# Patient Record
Sex: MALE | Race: WHITE | NOT HISPANIC OR LATINO | Employment: STUDENT | URBAN - METROPOLITAN AREA
[De-identification: names, ages, dates, MRNs, and addresses within clinical notes are randomized per-mention and may not be internally consistent; named-entity substitution may affect disease eponyms.]

---
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
## 2019-01-04 DIAGNOSIS — Z87.74 HISTORY OF COMPLETE TRANSPOSITION OF GREAT VESSELS: Primary | ICD-10-CM

## 2019-01-08 LAB
OHS CV EVENT MONITOR DAY: 1
OHS CV HOLTER LENGTH DECIMAL HOURS: 25
OHS CV HOLTER LENGTH HOURS: 1
OHS CV HOLTER LENGTH MINUTES: 0

## 2019-01-09 ENCOUNTER — TELEPHONE (OUTPATIENT)
Dept: PEDIATRIC CARDIOLOGY | Facility: CLINIC | Age: 19
End: 2019-01-09

## 2019-02-13 ENCOUNTER — TELEPHONE (OUTPATIENT)
Dept: PEDIATRIC CARDIOLOGY | Facility: CLINIC | Age: 19
End: 2019-02-13

## 2019-02-13 NOTE — TELEPHONE ENCOUNTER
Spoke with mom, will fax over last office note, ekg and echo report, to Dr White in [place].    ----- Message from Christy Cole sent at 2/13/2019  3:44 PM CST -----  Contact: Mom [phone]  Reason for call: Patient is currently seeing a Cardiologist        Communication Preference: Mom [phone]    Additional Information: Mom states patient is currently seeing a Cardiologist in [place] and they may call to request records.

## 2019-02-27 ENCOUNTER — DOCUMENTATION ONLY (OUTPATIENT)
Dept: PEDIATRIC CARDIOLOGY | Facility: CLINIC | Age: 19
End: 2019-02-27

## 2019-02-27 NOTE — PROGRESS NOTES
I reviewed a clinic note from Dr. White dated February 18, 2019.  He had recently gone to the [facility] secondary to shortness of breath.  An EKG was normal.  An echocardiogram revealed mild pulmonary stenosis and mild to moderate pulmonary insufficiency.  There was no aortic insufficiency.  Biventricular function was normal.  It was felt that his symptoms were not cardiac in origin and likely right do instead to a viral upper respiratory illness.  He recommended follow-up with us as scheduled.

## 2019-09-21 ENCOUNTER — ANESTHESIA EVENT (OUTPATIENT)
Dept: SURGERY | Facility: HOSPITAL | Age: 19
End: 2019-09-21
Payer: MEDICAID

## 2019-09-21 ENCOUNTER — HOSPITAL ENCOUNTER (OUTPATIENT)
Facility: HOSPITAL | Age: 19
Discharge: HOME | End: 2019-09-22
Attending: EMERGENCY MEDICINE | Admitting: INTERNAL MEDICINE
Payer: MEDICAID

## 2019-09-21 ENCOUNTER — ANESTHESIA (OUTPATIENT)
Dept: SURGERY | Facility: HOSPITAL | Age: 19
End: 2019-09-21
Payer: MEDICAID

## 2019-09-21 DIAGNOSIS — K35.80 ACUTE APPENDICITIS, UNSPECIFIED ACUTE APPENDICITIS TYPE: ICD-10-CM

## 2019-09-21 DIAGNOSIS — K35.30 ACUTE APPENDICITIS WITH LOCALIZED PERITONITIS WITHOUT PERFORATION, UNSPECIFIED WHETHER ABSCESS PRESENT, UNSPECIFIED WHETHER GANGRENE PRESENT: Primary | ICD-10-CM

## 2019-09-21 DIAGNOSIS — K35.30 ACUTE APPENDICITIS WITH LOCALIZED PERITONITIS, WITHOUT PERFORATION, ABSCESS, OR GANGRENE: ICD-10-CM

## 2019-09-21 DIAGNOSIS — Z87.74 HISTORY OF COMPLETE TRANSPOSITION OF GREAT VESSELS: ICD-10-CM

## 2019-09-21 DIAGNOSIS — Z01.810 PREOPERATIVE CARDIOVASCULAR EXAMINATION: ICD-10-CM

## 2019-09-21 DIAGNOSIS — D72.829 LEUKOCYTOSIS, UNSPECIFIED TYPE: ICD-10-CM

## 2019-09-21 DIAGNOSIS — Q25.6 PULMONARY ARTERY STENOSIS: ICD-10-CM

## 2019-09-21 LAB
ALBUMIN SERPL BCP-MCNC: 5 G/DL (ref 3.5–5.2)
ALP SERPL-CCNC: 43 U/L (ref 55–135)
ALT SERPL W/O P-5'-P-CCNC: 29 U/L (ref 10–44)
ANION GAP SERPL CALC-SCNC: 8 MMOL/L (ref 8–16)
AST SERPL-CCNC: 24 U/L (ref 10–40)
BASOPHILS # BLD AUTO: 0.03 K/UL (ref 0–0.2)
BASOPHILS NFR BLD: 0.2 % (ref 0–1.9)
BILIRUB SERPL-MCNC: 1.4 MG/DL (ref 0.1–1)
BILIRUB UR QL STRIP: NEGATIVE
BUN SERPL-MCNC: 17 MG/DL (ref 6–20)
CALCIUM SERPL-MCNC: 9.7 MG/DL (ref 8.7–10.5)
CHLORIDE SERPL-SCNC: 101 MMOL/L (ref 95–110)
CLARITY UR: CLEAR
CO2 SERPL-SCNC: 29 MMOL/L (ref 23–29)
COLOR UR: YELLOW
CREAT SERPL-MCNC: 1 MG/DL (ref 0.5–1.4)
DIFFERENTIAL METHOD: ABNORMAL
EOSINOPHIL # BLD AUTO: 0 K/UL (ref 0–0.5)
EOSINOPHIL NFR BLD: 0.2 % (ref 0–8)
ERYTHROCYTE [DISTWIDTH] IN BLOOD BY AUTOMATED COUNT: 12.4 % (ref 11.5–14.5)
EST. GFR  (AFRICAN AMERICAN): >60 ML/MIN/1.73 M^2
EST. GFR  (NON AFRICAN AMERICAN): >60 ML/MIN/1.73 M^2
GLUCOSE SERPL-MCNC: 101 MG/DL (ref 70–110)
GLUCOSE UR QL STRIP: NEGATIVE
HCT VFR BLD AUTO: 47.3 % (ref 40–54)
HGB BLD-MCNC: 16 G/DL (ref 14–18)
HGB UR QL STRIP: NEGATIVE
IMM GRANULOCYTES # BLD AUTO: 0.05 K/UL (ref 0–0.04)
IMM GRANULOCYTES NFR BLD AUTO: 0.3 % (ref 0–0.5)
KETONES UR QL STRIP: NEGATIVE
LEUKOCYTE ESTERASE UR QL STRIP: NEGATIVE
LIPASE SERPL-CCNC: 28 U/L (ref 4–60)
LYMPHOCYTES # BLD AUTO: 2.2 K/UL (ref 1–4.8)
LYMPHOCYTES NFR BLD: 13.1 % (ref 18–48)
MCH RBC QN AUTO: 31.2 PG (ref 27–31)
MCHC RBC AUTO-ENTMCNC: 33.8 G/DL (ref 32–36)
MCV RBC AUTO: 92 FL (ref 82–98)
MONOCYTES # BLD AUTO: 1.1 K/UL (ref 0.3–1)
MONOCYTES NFR BLD: 6.7 % (ref 4–15)
NEUTROPHILS # BLD AUTO: 13.2 K/UL (ref 1.8–7.7)
NEUTROPHILS NFR BLD: 79.5 % (ref 38–73)
NITRITE UR QL STRIP: NEGATIVE
NRBC BLD-RTO: 0 /100 WBC
PH UR STRIP: 8 [PH] (ref 5–8)
PLATELET # BLD AUTO: 169 K/UL (ref 150–350)
PMV BLD AUTO: 10.1 FL (ref 9.2–12.9)
POTASSIUM SERPL-SCNC: 4.3 MMOL/L (ref 3.5–5.1)
PROT SERPL-MCNC: 8.2 G/DL (ref 6–8.4)
PROT UR QL STRIP: NEGATIVE
RBC # BLD AUTO: 5.13 M/UL (ref 4.6–6.2)
SODIUM SERPL-SCNC: 138 MMOL/L (ref 136–145)
SP GR UR STRIP: 1 (ref 1–1.03)
URN SPEC COLLECT METH UR: ABNORMAL
UROBILINOGEN UR STRIP-ACNC: NEGATIVE EU/DL
WBC # BLD AUTO: 16.66 K/UL (ref 3.9–12.7)

## 2019-09-21 PROCEDURE — 37000008 HC ANESTHESIA 1ST 15 MINUTES: Performed by: SURGERY

## 2019-09-21 PROCEDURE — 27201423 OPTIME MED/SURG SUP & DEVICES STERILE SUPPLY: Performed by: SURGERY

## 2019-09-21 PROCEDURE — 27202105 HC BIS BILATERAL SENSOR: Performed by: ANESTHESIOLOGY

## 2019-09-21 PROCEDURE — 71000033 HC RECOVERY, INTIAL HOUR: Performed by: SURGERY

## 2019-09-21 PROCEDURE — 25000003 PHARM REV CODE 250: Performed by: SURGERY

## 2019-09-21 PROCEDURE — 93005 ELECTROCARDIOGRAM TRACING: CPT

## 2019-09-21 PROCEDURE — 00840 ANES IPER PX LOWER ABD NOS: CPT | Performed by: SURGERY

## 2019-09-21 PROCEDURE — 99204 PR OFFICE/OUTPT VISIT, NEW, LEVL IV, 45-59 MIN: ICD-10-PCS | Mod: 57,,, | Performed by: SURGERY

## 2019-09-21 PROCEDURE — 96375 TX/PRO/DX INJ NEW DRUG ADDON: CPT

## 2019-09-21 PROCEDURE — 83690 ASSAY OF LIPASE: CPT

## 2019-09-21 PROCEDURE — 96374 THER/PROPH/DIAG INJ IV PUSH: CPT

## 2019-09-21 PROCEDURE — 25000003 PHARM REV CODE 250: Performed by: NURSE ANESTHETIST, CERTIFIED REGISTERED

## 2019-09-21 PROCEDURE — 96361 HYDRATE IV INFUSION ADD-ON: CPT

## 2019-09-21 PROCEDURE — 36000708 HC OR TIME LEV III 1ST 15 MIN: Performed by: SURGERY

## 2019-09-21 PROCEDURE — 27201107 HC STYLET, STANDARD: Performed by: ANESTHESIOLOGY

## 2019-09-21 PROCEDURE — 37000009 HC ANESTHESIA EA ADD 15 MINS: Performed by: SURGERY

## 2019-09-21 PROCEDURE — 63600175 PHARM REV CODE 636 W HCPCS: Performed by: EMERGENCY MEDICINE

## 2019-09-21 PROCEDURE — 44970 LAPAROSCOPY APPENDECTOMY: CPT | Mod: ,,, | Performed by: SURGERY

## 2019-09-21 PROCEDURE — 44970 PR LAP,APPENDECTOMY: ICD-10-PCS | Mod: ,,, | Performed by: SURGERY

## 2019-09-21 PROCEDURE — 99204 OFFICE O/P NEW MOD 45 MIN: CPT | Mod: 57,,, | Performed by: SURGERY

## 2019-09-21 PROCEDURE — 63600175 PHARM REV CODE 636 W HCPCS: Performed by: ANESTHESIOLOGY

## 2019-09-21 PROCEDURE — 80053 COMPREHEN METABOLIC PANEL: CPT

## 2019-09-21 PROCEDURE — 27000673 HC TUBING BLOOD Y: Performed by: ANESTHESIOLOGY

## 2019-09-21 PROCEDURE — G0378 HOSPITAL OBSERVATION PER HR: HCPCS

## 2019-09-21 PROCEDURE — 36415 COLL VENOUS BLD VENIPUNCTURE: CPT

## 2019-09-21 PROCEDURE — 27202103: Performed by: ANESTHESIOLOGY

## 2019-09-21 PROCEDURE — 71000039 HC RECOVERY, EACH ADD'L HOUR: Performed by: SURGERY

## 2019-09-21 PROCEDURE — 36000709 HC OR TIME LEV III EA ADD 15 MIN: Performed by: SURGERY

## 2019-09-21 PROCEDURE — 99285 EMERGENCY DEPT VISIT HI MDM: CPT | Mod: 25

## 2019-09-21 PROCEDURE — 25500020 PHARM REV CODE 255: Performed by: EMERGENCY MEDICINE

## 2019-09-21 PROCEDURE — 27000671 HC TUBING MICROBORE EXT: Performed by: ANESTHESIOLOGY

## 2019-09-21 PROCEDURE — 81003 URINALYSIS AUTO W/O SCOPE: CPT

## 2019-09-21 PROCEDURE — 63600175 PHARM REV CODE 636 W HCPCS: Performed by: NURSE ANESTHETIST, CERTIFIED REGISTERED

## 2019-09-21 PROCEDURE — S0028 INJECTION, FAMOTIDINE, 20 MG: HCPCS | Performed by: NURSE ANESTHETIST, CERTIFIED REGISTERED

## 2019-09-21 PROCEDURE — S0030 INJECTION, METRONIDAZOLE: HCPCS | Performed by: SURGERY

## 2019-09-21 PROCEDURE — 85025 COMPLETE CBC W/AUTO DIFF WBC: CPT

## 2019-09-21 PROCEDURE — 63600175 PHARM REV CODE 636 W HCPCS: Performed by: SURGERY

## 2019-09-21 PROCEDURE — 27000654 HC CATH IV JELCO: Performed by: ANESTHESIOLOGY

## 2019-09-21 RX ORDER — SODIUM CHLORIDE 9 MG/ML
INJECTION, SOLUTION INTRAVENOUS CONTINUOUS
Status: DISCONTINUED | OUTPATIENT
Start: 2019-09-21 | End: 2019-09-22 | Stop reason: HOSPADM

## 2019-09-21 RX ORDER — HYDROCODONE BITARTRATE AND ACETAMINOPHEN 5; 325 MG/1; MG/1
1 TABLET ORAL EVERY 4 HOURS PRN
Status: DISCONTINUED | OUTPATIENT
Start: 2019-09-21 | End: 2019-09-22 | Stop reason: HOSPADM

## 2019-09-21 RX ORDER — MEPERIDINE HYDROCHLORIDE 50 MG/ML
12.5 INJECTION INTRAMUSCULAR; INTRAVENOUS; SUBCUTANEOUS EVERY 10 MIN PRN
Status: ACTIVE | OUTPATIENT
Start: 2019-09-21 | End: 2019-09-21

## 2019-09-21 RX ORDER — ACETAMINOPHEN 10 MG/ML
1000 INJECTION, SOLUTION INTRAVENOUS EVERY 8 HOURS
Status: CANCELLED | OUTPATIENT
Start: 2019-09-21 | End: 2019-09-22

## 2019-09-21 RX ORDER — ENOXAPARIN SODIUM 100 MG/ML
40 INJECTION SUBCUTANEOUS EVERY 24 HOURS
Status: DISCONTINUED | OUTPATIENT
Start: 2019-09-22 | End: 2019-09-22 | Stop reason: HOSPADM

## 2019-09-21 RX ORDER — METRONIDAZOLE 500 MG/100ML
500 INJECTION, SOLUTION INTRAVENOUS
Status: COMPLETED | OUTPATIENT
Start: 2019-09-21 | End: 2019-09-22

## 2019-09-21 RX ORDER — GLYCOPYRROLATE 0.2 MG/ML
INJECTION INTRAMUSCULAR; INTRAVENOUS
Status: DISCONTINUED | OUTPATIENT
Start: 2019-09-21 | End: 2019-09-21

## 2019-09-21 RX ORDER — OXYCODONE HYDROCHLORIDE 5 MG/1
5 TABLET ORAL
Status: DISCONTINUED | OUTPATIENT
Start: 2019-09-21 | End: 2019-09-22 | Stop reason: HOSPADM

## 2019-09-21 RX ORDER — SODIUM CHLORIDE 0.9 % (FLUSH) 0.9 %
10 SYRINGE (ML) INJECTION
Status: DISCONTINUED | OUTPATIENT
Start: 2019-09-21 | End: 2019-09-22 | Stop reason: HOSPADM

## 2019-09-21 RX ORDER — BUPIVACAINE HYDROCHLORIDE AND EPINEPHRINE 5; 5 MG/ML; UG/ML
INJECTION, SOLUTION EPIDURAL; INTRACAUDAL; PERINEURAL
Status: DISCONTINUED | OUTPATIENT
Start: 2019-09-21 | End: 2019-09-21 | Stop reason: HOSPADM

## 2019-09-21 RX ORDER — DIPHENHYDRAMINE HYDROCHLORIDE 50 MG/ML
12.5 INJECTION INTRAMUSCULAR; INTRAVENOUS
Status: DISCONTINUED | OUTPATIENT
Start: 2019-09-21 | End: 2019-09-22 | Stop reason: HOSPADM

## 2019-09-21 RX ORDER — ACETAMINOPHEN 10 MG/ML
INJECTION, SOLUTION INTRAVENOUS
Status: DISCONTINUED | OUTPATIENT
Start: 2019-09-21 | End: 2019-09-21

## 2019-09-21 RX ORDER — KETOROLAC TROMETHAMINE 30 MG/ML
15 INJECTION, SOLUTION INTRAMUSCULAR; INTRAVENOUS
Status: COMPLETED | OUTPATIENT
Start: 2019-09-21 | End: 2019-09-21

## 2019-09-21 RX ORDER — CEFAZOLIN SODIUM 2 G/50ML
2 SOLUTION INTRAVENOUS
Status: COMPLETED | OUTPATIENT
Start: 2019-09-21 | End: 2019-09-22

## 2019-09-21 RX ORDER — HYDROMORPHONE HYDROCHLORIDE 1 MG/ML
1 INJECTION, SOLUTION INTRAMUSCULAR; INTRAVENOUS; SUBCUTANEOUS EVERY 4 HOURS PRN
Status: DISCONTINUED | OUTPATIENT
Start: 2019-09-21 | End: 2019-09-22 | Stop reason: HOSPADM

## 2019-09-21 RX ORDER — SODIUM CHLORIDE 0.9 G/100ML
IRRIGANT IRRIGATION
Status: DISCONTINUED | OUTPATIENT
Start: 2019-09-21 | End: 2019-09-21 | Stop reason: HOSPADM

## 2019-09-21 RX ORDER — PROPOFOL 10 MG/ML
VIAL (ML) INTRAVENOUS
Status: DISCONTINUED | OUTPATIENT
Start: 2019-09-21 | End: 2019-09-21

## 2019-09-21 RX ORDER — LIDOCAINE HYDROCHLORIDE 20 MG/ML
INJECTION, SOLUTION EPIDURAL; INFILTRATION; INTRACAUDAL; PERINEURAL
Status: DISCONTINUED | OUTPATIENT
Start: 2019-09-21 | End: 2019-09-21

## 2019-09-21 RX ORDER — ROCURONIUM BROMIDE 10 MG/ML
INJECTION, SOLUTION INTRAVENOUS
Status: DISCONTINUED | OUTPATIENT
Start: 2019-09-21 | End: 2019-09-21

## 2019-09-21 RX ORDER — HYDROMORPHONE HYDROCHLORIDE 1 MG/ML
0.2 INJECTION, SOLUTION INTRAMUSCULAR; INTRAVENOUS; SUBCUTANEOUS
Status: DISCONTINUED | OUTPATIENT
Start: 2019-09-21 | End: 2019-09-22 | Stop reason: HOSPADM

## 2019-09-21 RX ORDER — FAMOTIDINE 10 MG/ML
INJECTION INTRAVENOUS
Status: DISCONTINUED | OUTPATIENT
Start: 2019-09-21 | End: 2019-09-21

## 2019-09-21 RX ORDER — ONDANSETRON 2 MG/ML
4 INJECTION INTRAMUSCULAR; INTRAVENOUS DAILY PRN
Status: DISCONTINUED | OUTPATIENT
Start: 2019-09-21 | End: 2019-09-22 | Stop reason: HOSPADM

## 2019-09-21 RX ORDER — SUCCINYLCHOLINE CHLORIDE 20 MG/ML
INJECTION INTRAMUSCULAR; INTRAVENOUS
Status: DISCONTINUED | OUTPATIENT
Start: 2019-09-21 | End: 2019-09-21

## 2019-09-21 RX ORDER — DEXAMETHASONE SODIUM PHOSPHATE 4 MG/ML
INJECTION, SOLUTION INTRA-ARTICULAR; INTRALESIONAL; INTRAMUSCULAR; INTRAVENOUS; SOFT TISSUE
Status: DISCONTINUED | OUTPATIENT
Start: 2019-09-21 | End: 2019-09-21

## 2019-09-21 RX ORDER — ONDANSETRON 2 MG/ML
4 INJECTION INTRAMUSCULAR; INTRAVENOUS
Status: COMPLETED | OUTPATIENT
Start: 2019-09-21 | End: 2019-09-21

## 2019-09-21 RX ORDER — SODIUM CHLORIDE, SODIUM LACTATE, POTASSIUM CHLORIDE, CALCIUM CHLORIDE 600; 310; 30; 20 MG/100ML; MG/100ML; MG/100ML; MG/100ML
INJECTION, SOLUTION INTRAVENOUS CONTINUOUS PRN
Status: DISCONTINUED | OUTPATIENT
Start: 2019-09-21 | End: 2019-09-21

## 2019-09-21 RX ORDER — FENTANYL CITRATE 50 UG/ML
INJECTION, SOLUTION INTRAMUSCULAR; INTRAVENOUS
Status: DISCONTINUED | OUTPATIENT
Start: 2019-09-21 | End: 2019-09-21

## 2019-09-21 RX ORDER — SODIUM CHLORIDE 0.9 % (FLUSH) 0.9 %
10 SYRINGE (ML) INJECTION
Status: DISCONTINUED | OUTPATIENT
Start: 2019-09-21 | End: 2019-09-21

## 2019-09-21 RX ORDER — MIDAZOLAM HYDROCHLORIDE 1 MG/ML
INJECTION INTRAMUSCULAR; INTRAVENOUS
Status: DISCONTINUED | OUTPATIENT
Start: 2019-09-21 | End: 2019-09-21

## 2019-09-21 RX ADMIN — GLYCOPYRROLATE 0.2 MG: 0.2 INJECTION INTRAMUSCULAR; INTRAVENOUS at 05:09

## 2019-09-21 RX ADMIN — SODIUM CHLORIDE, SODIUM LACTATE, POTASSIUM CHLORIDE, AND CALCIUM CHLORIDE: .6; .31; .03; .02 INJECTION, SOLUTION INTRAVENOUS at 04:09

## 2019-09-21 RX ADMIN — SODIUM CHLORIDE 1000 ML: 0.9 INJECTION, SOLUTION INTRAVENOUS at 01:09

## 2019-09-21 RX ADMIN — PIPERACILLIN AND TAZOBACTAM 3.38 G: 3; .375 INJECTION, POWDER, LYOPHILIZED, FOR SOLUTION INTRAVENOUS; PARENTERAL at 04:09

## 2019-09-21 RX ADMIN — ROCURONIUM BROMIDE 10 MG: 10 INJECTION, SOLUTION INTRAVENOUS at 05:09

## 2019-09-21 RX ADMIN — SODIUM CHLORIDE, SODIUM LACTATE, POTASSIUM CHLORIDE, AND CALCIUM CHLORIDE: .6; .31; .03; .02 INJECTION, SOLUTION INTRAVENOUS at 05:09

## 2019-09-21 RX ADMIN — SUCCINYLCHOLINE CHLORIDE 120 MG: 20 INJECTION, SOLUTION INTRAMUSCULAR; INTRAVENOUS at 05:09

## 2019-09-21 RX ADMIN — IOHEXOL 100 ML: 350 INJECTION, SOLUTION INTRAVENOUS at 03:09

## 2019-09-21 RX ADMIN — LIDOCAINE HYDROCHLORIDE 100 MG: 20 INJECTION, SOLUTION EPIDURAL; INFILTRATION; INTRACAUDAL; PERINEURAL at 05:09

## 2019-09-21 RX ADMIN — MIDAZOLAM HYDROCHLORIDE 2 MG: 1 INJECTION, SOLUTION INTRAMUSCULAR; INTRAVENOUS at 04:09

## 2019-09-21 RX ADMIN — ROCURONIUM BROMIDE 35 MG: 10 INJECTION, SOLUTION INTRAVENOUS at 05:09

## 2019-09-21 RX ADMIN — CEFAZOLIN SODIUM 2 G: 2 SOLUTION INTRAVENOUS at 10:09

## 2019-09-21 RX ADMIN — ONDANSETRON 4 MG: 2 INJECTION INTRAMUSCULAR; INTRAVENOUS at 01:09

## 2019-09-21 RX ADMIN — DEXAMETHASONE SODIUM PHOSPHATE 8 MG: 4 INJECTION, SOLUTION INTRAMUSCULAR; INTRAVENOUS at 05:09

## 2019-09-21 RX ADMIN — ACETAMINOPHEN 1000 MG: 10 INJECTION, SOLUTION INTRAVENOUS at 04:09

## 2019-09-21 RX ADMIN — METRONIDAZOLE 500 MG: 500 SOLUTION INTRAVENOUS at 08:09

## 2019-09-21 RX ADMIN — PROPOFOL 50 MG: 10 INJECTION, EMULSION INTRAVENOUS at 05:09

## 2019-09-21 RX ADMIN — FENTANYL CITRATE 50 MCG: 50 INJECTION INTRAMUSCULAR; INTRAVENOUS at 05:09

## 2019-09-21 RX ADMIN — SUGAMMADEX 318 MG: 100 INJECTION, SOLUTION INTRAVENOUS at 05:09

## 2019-09-21 RX ADMIN — KETOROLAC TROMETHAMINE 15 MG: 30 INJECTION, SOLUTION INTRAMUSCULAR at 01:09

## 2019-09-21 RX ADMIN — ONDANSETRON 4 MG: 2 INJECTION INTRAMUSCULAR; INTRAVENOUS at 06:09

## 2019-09-21 RX ADMIN — PROPOFOL 200 MG: 10 INJECTION, EMULSION INTRAVENOUS at 05:09

## 2019-09-21 RX ADMIN — ROCURONIUM BROMIDE 5 MG: 10 INJECTION, SOLUTION INTRAVENOUS at 05:09

## 2019-09-21 RX ADMIN — FENTANYL CITRATE 100 MCG: 50 INJECTION INTRAMUSCULAR; INTRAVENOUS at 05:09

## 2019-09-21 RX ADMIN — FAMOTIDINE 20 MG: 10 INJECTION, SOLUTION INTRAVENOUS at 04:09

## 2019-09-21 NOTE — ASSESSMENT & PLAN NOTE
CT showed appendicitis. IV abx given in ED. General Surgery consulted with plan for appendectomy. NPO

## 2019-09-21 NOTE — TRANSFER OF CARE
"Anesthesia Transfer of Care Note    Patient: Harry Patel    Procedure(s) Performed: Procedure(s) (LRB):  APPENDECTOMY, LAPAROSCOPIC (N/A)    Patient location: PACU    Anesthesia Type: general    Transport from OR: Transported from OR on room air with adequate spontaneous ventilation    Post pain: adequate analgesia    Post assessment: no apparent anesthetic complications    Post vital signs: stable    Level of consciousness: awake, alert and oriented    Nausea/Vomiting: no nausea/vomiting    Complications: none    Transfer of care protocol was followed      Last vitals:   Visit Vitals  BP (!) 115/57 (BP Location: Left arm, Patient Position: Lying)   Pulse 68   Temp 36.6 °C (97.8 °F) (Temporal)   Resp 16   Ht 5' 11" (1.803 m)   Wt 79.4 kg (175 lb)   SpO2 100%   BMI 24.41 kg/m²     "

## 2019-09-21 NOTE — SUBJECTIVE & OBJECTIVE
No current facility-administered medications on file prior to encounter.      No current outpatient medications on file prior to encounter.       Review of patient's allergies indicates:  No Known Allergies    Past Medical History:   Diagnosis Date    Diaphragmatic paralysis     post-op; left side    Pulmonary stenosis     mild    TGA (transposition of great arteries)     Wrist fracture      Past Surgical History:   Procedure Laterality Date    ARTERIAL SWITCH  12/17/99    @ [facility]    CARDIAC SURGERY      DIAPHRAGM PLICATION  12/27/99    @ [facility] - left    WISDOM TOOTH EXTRACTION       Family History     Problem Relation (Age of Onset)    AVM Mother    Congenital heart disease Sister        Tobacco Use    Smoking status: Former Smoker    Smokeless tobacco: Never Used    Tobacco comment: grandfather smokes outside.   Substance and Sexual Activity    Alcohol use: Yes    Drug use: Not Currently     Types: Marijuana    Sexual activity: Not on file     Review of Systems   Constitutional: Negative for activity change, appetite change, diaphoresis, fever and unexpected weight change.   Respiratory: Negative for cough, chest tightness and wheezing.    Cardiovascular: Negative for chest pain and palpitations.   Gastrointestinal: Positive for abdominal pain. Negative for abdominal distention, anal bleeding, blood in stool, constipation, diarrhea, nausea, rectal pain and vomiting.   Genitourinary: Negative for difficulty urinating, dysuria and hematuria.   Musculoskeletal: Negative for back pain and gait problem.   Skin: Negative for color change and wound.   Neurological: Negative for tremors and seizures.   Hematological: Negative for adenopathy. Does not bruise/bleed easily.   Psychiatric/Behavioral: Negative for agitation and decreased concentration.     Objective:     Vital Signs (Most Recent):  Pulse: (!) 55 (09/21/19 1500)  Resp: 16 (09/21/19 1318)  BP: (!) 126/58 (09/21/19 1500)  SpO2: 99 % (09/21/19 1500)  Vital Signs (24h Range):  Pulse:  [55-63] 55  Resp:  [16] 16  SpO2:  [98 %-100 %] 99 %  BP: (125-143)/(58-73) 126/58     Weight: 79.4 kg (175 lb)  Body mass index is 24.41 kg/m².    Physical Exam   Constitutional: He is oriented to person, place, and time. He appears well-developed and well-nourished.   HENT:   Head: Normocephalic and atraumatic.   Eyes: Pupils are equal, round, and reactive to light. Right eye exhibits no discharge. Left eye exhibits no discharge. No scleral icterus.   Neck: Normal range of motion. No tracheal deviation present. No thyromegaly present.   Cardiovascular: Normal rate, regular rhythm and normal heart sounds. Exam reveals no gallop and no friction rub.   No murmur heard.  Pulmonary/Chest: Effort normal and breath sounds normal. He has no wheezes. He exhibits no tenderness.   Abdominal: He exhibits no distension and no mass. There is tenderness. There is guarding. There is no rebound. No hernia.       Musculoskeletal: Normal range of motion.   Lymphadenopathy:     He has no cervical adenopathy.   Neurological: He is alert and oriented to person, place, and time. Coordination normal.   Skin: Skin is warm.   Psychiatric: He has a normal mood and affect.   Vitals reviewed.      Significant Labs:  CBC:   Recent Labs   Lab 09/21/19  1340   WBC 16.66*   RBC 5.13   HGB 16.0   HCT 47.3      MCV 92   MCH 31.2*   MCHC 33.8     BMP:   Recent Labs   Lab 09/21/19  1340         K 4.3      CO2 29   BUN 17   CREATININE 1.0   CALCIUM 9.7       Significant Diagnostics:  CT scan with findings suggestive of acute appendicitis

## 2019-09-21 NOTE — BRIEF OP NOTE
[facility]  Brief Operative Note    SUMMARY     Surgery Date: 9/21/2019     Surgeon(s) and Role:     * Harsh Power MD - Primary    Assisting Surgeon: None    Pre-op Diagnosis:  Acute appendicitis with localized peritonitis without perforation, unspecified whether abscess present, unspecified whether gangrene present [K35.30]    Post-op Diagnosis:  Post-Op Diagnosis Codes:     * Acute appendicitis with localized peritonitis without perforation, unspecified whether abscess present, unspecified whether gangrene present [K35.30]    Procedure(s) (LRB):  APPENDECTOMY, LAPAROSCOPIC (N/A)    Anesthesia: General    Description of Procedure: Laparoscopic appendectomy of inflamed appendix    Description of the findings of the procedure: see above    Estimated Blood Loss: 15 mL         Specimens:   Specimen (12h ago, onward)    Start     Ordered    09/21/19 1738  Specimen to Pathology - Surgery  Once     Comments:  Pre-op Diagnosis: Acute appendicitis with localized peritonitis without perforation, unspecified whether abscess present, unspecified whether gangrene present [K35.30]Procedure(s):APPENDECTOMY, LAPAROSCOPIC Number of specimens: 1Name of specimens: appendix     Start Status     09/21/19 1738 Collected (09/21/19 1739) Order ID: [id number]       09/21/19 1738

## 2019-09-21 NOTE — CONSULTS
[facility]  General Surgery  Consult Note    Patient Name: Harry Patel  MRN: [id number]  Code Status: Full Code  Admission Date: 9/21/2019  Hospital Length of Stay: 0 days  Attending Physician: Christopher Moreira MD  Primary Care Provider: Primary Doctor No    Patient information was obtained from patient and ER records.     Inpatient consult to General Surgery  Consult performed by: Harsh Power MD  Consult ordered by: JARET Soto  Assessment/Recommendations: TO OR        Subjective:     Principal Problem: Acute appendicitis    History of Present Illness: Pleasant 17 yo M presented to the hospital this afternoon with pain in thelower abdomen.  He notes that he woke with pain early this Am.  NOtes that the pain got progressively worse and caused him to come to er.  Notes that pain was in lower abdomen.  He reports having burning and discomfort with urination.  He denies sick contacts or changes in bowel habits.  PT has no family history of IBD.  Pt otherwise without complaint. He is healthy and has had transposition procedure as an infant.  No abdominal surgical history    No new subjective & objective note has been filed under this hospital service since the last note was generated.    Assessment/Plan:     * Acute appendicitis  To OR today for lap appy      VTE Risk Mitigation (From admission, onward)    None          Thank you for your consult. I will follow-up with patient. Please contact us if you have any additional questions.    Harsh Power MD  General Surgery  [facility]

## 2019-09-21 NOTE — H&P
[facility]  General Surgery  History & Physical    Patient Name: Harry Patel  MRN: [id number]  Admission Date: 9/21/2019  Attending Physician: Christopher Moreira MD   Primary Care Provider: Primary Doctor No    Patient information was obtained from patient and ER records.     Subjective:     Chief Complaint/Reason for Admission: appendicitis    History of Present Illness: Pleasant 19 yo M presented to the hospital this afternoon with pain in thelower abdomen.  He notes that he woke with pain early this Am.  NOtes that the pain got progressively worse and caused him to come to er.  Notes that pain was in lower abdomen.  He reports having burning and discomfort with urination.  He denies sick contacts or changes in bowel habits.  PT has no family history of IBD.  Pt otherwise without complaint. He is healthy and has had transposition procedure as an infant.  No abdominal surgical history    No current facility-administered medications on file prior to encounter.      No current outpatient medications on file prior to encounter.       Review of patient's allergies indicates:  No Known Allergies    Past Medical History:   Diagnosis Date    Diaphragmatic paralysis     post-op; left side    Pulmonary stenosis     mild    TGA (transposition of great arteries)     Wrist fracture      Past Surgical History:   Procedure Laterality Date    ARTERIAL SWITCH  12/17/99    @ [facility]    CARDIAC SURGERY      DIAPHRAGM PLICATION  12/27/99    @ [facility] - left    WISDOM TOOTH EXTRACTION       Family History     Problem Relation (Age of Onset)    AVM Mother    Congenital heart disease Sister        Tobacco Use    Smoking status: Former Smoker    Smokeless tobacco: Never Used    Tobacco comment: grandfather smokes outside.   Substance and Sexual Activity    Alcohol use: Yes    Drug use: Not Currently     Types: Marijuana    Sexual activity: Not on file     Review of Systems   Constitutional: Negative for activity  change, appetite change, diaphoresis, fever and unexpected weight change.   Respiratory: Negative for cough, chest tightness and wheezing.    Cardiovascular: Negative for chest pain and palpitations.   Gastrointestinal: Positive for abdominal pain. Negative for abdominal distention, anal bleeding, blood in stool, constipation, diarrhea, nausea, rectal pain and vomiting.   Genitourinary: Negative for difficulty urinating, dysuria and hematuria.   Musculoskeletal: Negative for back pain and gait problem.   Skin: Negative for color change and wound.   Neurological: Negative for tremors and seizures.   Hematological: Negative for adenopathy. Does not bruise/bleed easily.   Psychiatric/Behavioral: Negative for agitation and decreased concentration.     Objective:     Vital Signs (Most Recent):  Pulse: (!) 55 (09/21/19 1500)  Resp: 16 (09/21/19 1318)  BP: (!) 126/58 (09/21/19 1500)  SpO2: 99 % (09/21/19 1500) Vital Signs (24h Range):  Pulse:  [55-63] 55  Resp:  [16] 16  SpO2:  [98 %-100 %] 99 %  BP: (125-143)/(58-73) 126/58     Weight: 79.4 kg (175 lb)  Body mass index is 24.41 kg/m².    Physical Exam   Constitutional: He is oriented to person, place, and time. He appears well-developed and well-nourished.   HENT:   Head: Normocephalic and atraumatic.   Eyes: Pupils are equal, round, and reactive to light. Right eye exhibits no discharge. Left eye exhibits no discharge. No scleral icterus.   Neck: Normal range of motion. No tracheal deviation present. No thyromegaly present.   Cardiovascular: Normal rate, regular rhythm and normal heart sounds. Exam reveals no gallop and no friction rub.   No murmur heard.  Pulmonary/Chest: Effort normal and breath sounds normal. He has no wheezes. He exhibits no tenderness.   Abdominal: He exhibits no distension and no mass. There is tenderness. There is guarding. There is no rebound. No hernia.       Musculoskeletal: Normal range of motion.   Lymphadenopathy:     He has no cervical  adenopathy.   Neurological: He is alert and oriented to person, place, and time. Coordination normal.   Skin: Skin is warm.   Psychiatric: He has a normal mood and affect.   Vitals reviewed.      Significant Labs:  CBC:   Recent Labs   Lab 09/21/19  1340   WBC 16.66*   RBC 5.13   HGB 16.0   HCT 47.3      MCV 92   MCH 31.2*   MCHC 33.8     BMP:   Recent Labs   Lab 09/21/19  1340         K 4.3      CO2 29   BUN 17   CREATININE 1.0   CALCIUM 9.7       Significant Diagnostics:  CT scan with findings suggestive of acute appendicitis    Assessment/Plan:     * Acute appendicitis  To OR today for lap appy      VTE Risk Mitigation (From admission, onward)    None          Harsh Power MD  General Surgery  [facility]

## 2019-09-21 NOTE — SUBJECTIVE & OBJECTIVE
Past Medical History:   Diagnosis Date    Diaphragmatic paralysis     post-op; left side    Pulmonary stenosis     mild    TGA (transposition of great arteries)     Wrist fracture        Past Surgical History:   Procedure Laterality Date    ARTERIAL SWITCH  12/17/99    @ [facility]    CARDIAC SURGERY      DIAPHRAGM PLICATION  12/27/99    @ [facility] - left    WISDOM TOOTH EXTRACTION         Review of patient's allergies indicates:  No Known Allergies    No current facility-administered medications on file prior to encounter.      No current outpatient medications on file prior to encounter.     Family History     Problem Relation (Age of Onset)    AVM Mother    Congenital heart disease Sister        Tobacco Use    Smoking status: Former Smoker    Smokeless tobacco: Never Used    Tobacco comment: grandfather smokes outside.   Substance and Sexual Activity    Alcohol use: Yes    Drug use: Not Currently     Types: Marijuana    Sexual activity: Not on file     Review of Systems   Constitutional: Negative for chills, diaphoresis and fever.   HENT: Negative for congestion, postnasal drip, sinus pressure and sore throat.    Eyes: Negative for visual disturbance.   Respiratory: Negative for cough, chest tightness, shortness of breath and wheezing.    Cardiovascular: Negative for chest pain, palpitations and leg swelling.   Gastrointestinal: Positive for abdominal distention (midabdomen) and nausea. Negative for abdominal pain, blood in stool, constipation, diarrhea and vomiting.   Endocrine: Negative.    Genitourinary: Negative for dysuria.   Musculoskeletal: Negative.    Skin: Negative.    Allergic/Immunologic: Negative.    Neurological: Negative for dizziness, weakness, numbness and headaches.   Hematological: Negative.    Psychiatric/Behavioral: Negative.      Objective:     Vital Signs (Most Recent):  Pulse: (!) 55 (09/21/19 1500)  Resp: 16 (09/21/19 1318)  BP: (!) 126/58 (09/21/19 1500)  SpO2: 99 % (09/21/19 1500)  Vital Signs (24h Range):  Pulse:  [55-63] 55  Resp:  [16] 16  SpO2:  [98 %-100 %] 99 %  BP: (125-143)/(58-73) 126/58     Weight: 79.4 kg (175 lb)  Body mass index is 24.41 kg/m².    Physical Exam   Constitutional: He is oriented to person, place, and time. He appears well-developed and well-nourished. He is cooperative.  Non-toxic appearance. No distress.   HENT:   Head: Normocephalic and atraumatic.   Eyes: Pupils are equal, round, and reactive to light. Conjunctivae and lids are normal.   Neck: Trachea normal, normal range of motion and full passive range of motion without pain. Neck supple. Normal carotid pulses and no JVD present. No tracheal deviation present. No thyroid mass and no thyromegaly present.   Cardiovascular: Normal rate, regular rhythm, S1 normal, S2 normal, normal heart sounds and normal pulses.   Pulmonary/Chest: Effort normal and breath sounds normal. No stridor.   Abdominal: Soft. Normal appearance and bowel sounds are normal. There is tenderness. There is no rebound and no guarding.   Musculoskeletal: Normal range of motion.   Neurological: He is alert and oriented to person, place, and time. He has normal strength. No cranial nerve deficit or sensory deficit.   Skin: Skin is warm, dry and intact. He is not diaphoretic. No cyanosis. Nails show no clubbing.   Psychiatric: He has a normal mood and affect. His speech is normal and behavior is normal. Judgment and thought content normal. Cognition and memory are normal.         CRANIAL NERVES     CN III, IV, VI   Pupils are equal, round, and reactive to light.       Significant Labs:   Blood Culture: No results for input(s): LABBLOO in the last 48 hours.  CBC:   Recent Labs   Lab 09/21/19  1340   WBC 16.66*   HGB 16.0   HCT 47.3        CMP:   Recent Labs   Lab 09/21/19  1340      K 4.3      CO2 29      BUN 17   CREATININE 1.0   CALCIUM 9.7   PROT 8.2   ALBUMIN 5.0   BILITOT 1.4*   ALKPHOS 43*   AST 24   ALT 29   ANIONGAP  8   EGFRNONAA >60.0     Cardiac Markers: No results for input(s): CKMB, MYOGLOBIN, BNP, TROPISTAT in the last 48 hours.  Coagulation: No results for input(s): PT, INR, APTT in the last 48 hours.  Lactic Acid: No results for input(s): LACTATE in the last 48 hours.  Lipase:   Recent Labs   Lab 09/21/19  1340   LIPASE 28     Lipid Panel: No results for input(s): CHOL, HDL, LDLCALC, TRIG, CHOLHDL in the last 48 hours.  Magnesium: No results for input(s): MG in the last 48 hours.  Troponin: No results for input(s): TROPONINI in the last 48 hours.  TSH: No results for input(s): TSH in the last 4320 hours.  Urine Studies:   Recent Labs   Lab 09/21/19  1415   COLORU Yellow   APPEARANCEUA Clear   PHUR 8.0   SPECGRAV 1.000*   PROTEINUA Negative   GLUCUA Negative   KETONESU Negative   BILIRUBINUA Negative   OCCULTUA Negative   NITRITE Negative   UROBILINOGEN Negative   LEUKOCYTESUR Negative       Significant Imaging: I have reviewed all pertinent imaging results/findings within the past 24 hours.   Ct Abdomen Pelvis With Contrast    Result Date: 9/21/2019  CMS MANDATED QUALITY DATA - CT RADIATION - 436 All CT scans at this facility utilize dose modulation, iterative reconstruction, and/or weight based dosing when appropriate to reduce radiation dose to as low as reasonably achievable. CLINICAL HISTORY: ([id number])19 y/o  (2000) M Abd pain, fever, abscess suspected; TECHNIQUE: (A#[id number], exam time 9/21/2019 15:17) CT ABDOMEN PELVIS WITH CONTRAST [id number] Axial CT images of the abdomen and pelvis were obtained from the dome of the diaphragm to the proximal thighs. COMPARISON: None available. FINDINGS: Lower Thorax: The lung bases are clear. The heart is normal in size. CT Abdomen: Liver: The liver is normal in size and imaging appearance. Gallbladder: The gallbladder is within normal limits. Biliary Tree: No intra or extrahepatic ductal dilation. Spleen: Normal. Pancreas: The pancreas is normal. Adrenal Glands: Normal  Kidneys: The kidneys are normal in imaging appearance without hydronephrosis or hydroureter. Vasculature: Normal. Lymph nodes: No abdominal lymphadenopathy is seen. Intraperitoneal structures: There is no ascites. Bowel: There 2 small appendicoliths in the appendix which is dilated and distended measuring 13 mm in diameter with mucosal hyperemia and adjacent fat stranding consistent with acute appendicitis with no abscess, intra-abdominal free air or obstruction.  Note, there is a moderate volume of gas and stool seen in the colon with a nonobstructive bowel gas pattern. Abdominal wall: The abdominal wall and musculature are normal. Musculoskeletal: Normal. CT Pelvis: Bladder: Normal. Reproductive Organs: The prostate is within normal limits. Pelvic Lymph nodes: No pelvic lymphadenopathy or pelvic mass is identified.     Acute appendicitis with no abscess, intra-abdominal free air or obstruction. Electronically signed by: Judah Moon MD Date:    09/21/2019 Time:    15:24

## 2019-09-21 NOTE — HOSPITAL COURSE
Admitted for abdominal 2/2 acute appendicitis.    09/21/2019 - CT abdomen/pelvis showed acute appendicitis with no abscess, intra-abdominal free air or obstruction.  09/21/2019 - Leukocytosis 2/2 acute appendicitis - IV abx given   09/21/2019 - General surgery consulted. - plans for appendectomy  9/21/2019- laparotomy appendectomy   9/22/19- stable and requesting discharge home. No abx upon discharge per Dr. Power

## 2019-09-21 NOTE — ED PROVIDER NOTES
Encounter Date: 9/21/2019       History     Chief Complaint   Patient presents with    Abdominal Pain     ONSET THIS AM    Nausea     Patient reports lessening soft with some epigastric pain and nausea.  Now patient have more right lower quadrant abdominal pain.  No vomiting. No diarrhea constipation.  No fever or chills. Pain is currently mild.  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        Review of patient's allergies indicates:  No Known Allergies  Past Medical History:   Diagnosis Date    Diaphragmatic paralysis     post-op; left side    Pulmonary stenosis     mild    TGA (transposition of great arteries)     Wrist fracture      Past Surgical History:   Procedure Laterality Date    ARTERIAL SWITCH  12/17/99    @ [facility]    CARDIAC SURGERY      DIAPHRAGM PLICATION  12/27/99    @ [facility] - left    WISDOM TOOTH EXTRACTION       Family History   Problem Relation Age of Onset    Congenital heart disease Sister         vsd    AVM Mother      Social History     Tobacco Use    Smoking status: Former Smoker    Smokeless tobacco: Never Used    Tobacco comment: grandfather smokes outside.   Substance Use Topics    Alcohol use: Yes    Drug use: Not Currently     Types: Marijuana     Review of Systems   Constitutional: Negative for chills and fever.   HENT: Negative for sore throat.    Eyes: Negative for photophobia and visual disturbance.   Respiratory: Negative for shortness of breath.    Cardiovascular: Negative for chest pain.   Gastrointestinal: Positive for abdominal pain. Negative for vomiting.   Genitourinary: Negative for dysuria.   Musculoskeletal: Negative for joint swelling.   Skin: Negative for rash.   Neurological: Negative for weakness and headaches.   Psychiatric/Behavioral: Negative for confusion.       Physical Exam     Initial Vitals [09/21/19 1318]   BP Pulse Resp Temp SpO2   (!) 143/64 60 16 -- 100 %      MAP        --         Physical Exam    Nursing note and vitals reviewed.  Constitutional: He is not diaphoretic. No distress.   HENT:   Head: Normocephalic and atraumatic.   Eyes: Conjunctivae are normal.   Neck: Normal range of motion.   Cardiovascular: Regular rhythm.   Pulmonary/Chest: Breath sounds normal.   Abdominal: Soft. Bowel sounds are normal.   Right lower quadrant abdominal pain with no guarding or rebound   Musculoskeletal: Normal range of motion.   Skin: No rash noted.   Psychiatric: He has a normal mood and affect.         ED Course   Procedures  Labs Reviewed - No data to display       Imaging Results    None          Medical Decision Making:   History:   Old Medical Records: I decided to obtain old medical records.  Clinical Tests:   Lab Tests: Reviewed  Radiological Study: Reviewed  Medical Tests: Reviewed  ED Management:  Patient presents with history and physical exam consistent with acute appendicitis.  CT confirmed diagnosis.  Dr. puga General surgery consulted.  Broad-spectrum antibiotics initiated.                      Clinical Impression:       ICD-10-CM ICD-9-CM   1. Acute appendicitis with localized peritonitis without perforation, unspecified whether abscess present, unspecified whether gangrene present K35.30 540.0   2. Preoperative cardiovascular examination Z01.810 V72.81                                Jeffery Gutierrez MD  09/21/19 1534

## 2019-09-21 NOTE — H&P
[facility] Medicine  History & Physical    Patient Name: Harry Patel  MRN: [id number]  Admission Date: 9/21/2019  Attending Physician: Christopher Moreira MD   Primary Care Provider: Primary Doctor No         Patient information was obtained from patient and ER records.     Subjective:     Principal Problem:Acute appendicitis    Chief Complaint:   Chief Complaint   Patient presents with    Abdominal Pain     ONSET THIS AM    Nausea        HPI: Harry Patel is a 18 y.o. male with a history as below who presented to the ED with a CC of acute onset of mid abdominal pain with nausea. Denies fever, chills, diaphoresis, SOB, dizziness, HA, chest pain, palpitations, NVD, recent trauma or any other associated symptoms. No aggravating and alleviating factor.  Does not smoke cigarettes, drink or do illegal drugs. Lab and imaging obtained and reviewed.   CT abdomen/pelvis showed acute appendicitis with no abscess, intra-abdominal free air or obstruction.  CBC significant for elevated WBC. IV abx given in the ER. Gen surgery consulted with plans for appendectomy            Past Medical History:   Diagnosis Date    Diaphragmatic paralysis     post-op; left side    Pulmonary stenosis     mild    TGA (transposition of great arteries)     Wrist fracture        Past Surgical History:   Procedure Laterality Date    ARTERIAL SWITCH  12/17/99    @ [facility]    CARDIAC SURGERY      DIAPHRAGM PLICATION  12/27/99    @ [facility] - left    WISDOM TOOTH EXTRACTION         Review of patient's allergies indicates:  No Known Allergies    No current facility-administered medications on file prior to encounter.      No current outpatient medications on file prior to encounter.     Family History     Problem Relation (Age of Onset)    AVM Mother    Congenital heart disease Sister        Tobacco Use    Smoking status: Former Smoker    Smokeless tobacco: Never Used    Tobacco comment: grandfather smokes outside.    Substance and Sexual Activity    Alcohol use: Yes    Drug use: Not Currently     Types: Marijuana    Sexual activity: Not on file     Review of Systems   Constitutional: Negative for chills, diaphoresis and fever.   HENT: Negative for congestion, postnasal drip, sinus pressure and sore throat.    Eyes: Negative for visual disturbance.   Respiratory: Negative for cough, chest tightness, shortness of breath and wheezing.    Cardiovascular: Negative for chest pain, palpitations and leg swelling.   Gastrointestinal: Positive for abdominal distention (midabdomen) and nausea. Negative for abdominal pain, blood in stool, constipation, diarrhea and vomiting.   Endocrine: Negative.    Genitourinary: Negative for dysuria.   Musculoskeletal: Negative.    Skin: Negative.    Allergic/Immunologic: Negative.    Neurological: Negative for dizziness, weakness, numbness and headaches.   Hematological: Negative.    Psychiatric/Behavioral: Negative.      Objective:     Vital Signs (Most Recent):  Pulse: (!) 55 (09/21/19 1500)  Resp: 16 (09/21/19 1318)  BP: (!) 126/58 (09/21/19 1500)  SpO2: 99 % (09/21/19 1500) Vital Signs (24h Range):  Pulse:  [55-63] 55  Resp:  [16] 16  SpO2:  [98 %-100 %] 99 %  BP: (125-143)/(58-73) 126/58     Weight: 79.4 kg (175 lb)  Body mass index is 24.41 kg/m².    Physical Exam   Constitutional: He is oriented to person, place, and time. He appears well-developed and well-nourished. He is cooperative.  Non-toxic appearance. No distress.   HENT:   Head: Normocephalic and atraumatic.   Eyes: Pupils are equal, round, and reactive to light. Conjunctivae and lids are normal.   Neck: Trachea normal, normal range of motion and full passive range of motion without pain. Neck supple. Normal carotid pulses and no JVD present. No tracheal deviation present. No thyroid mass and no thyromegaly present.   Cardiovascular: Normal rate, regular rhythm, S1 normal, S2 normal, normal heart sounds and normal pulses.    Pulmonary/Chest: Effort normal and breath sounds normal. No stridor.   Abdominal: Soft. Normal appearance and bowel sounds are normal. There is tenderness. There is no rebound and no guarding.   Musculoskeletal: Normal range of motion.   Neurological: He is alert and oriented to person, place, and time. He has normal strength. No cranial nerve deficit or sensory deficit.   Skin: Skin is warm, dry and intact. He is not diaphoretic. No cyanosis. Nails show no clubbing.   Psychiatric: He has a normal mood and affect. His speech is normal and behavior is normal. Judgment and thought content normal. Cognition and memory are normal.         CRANIAL NERVES     CN III, IV, VI   Pupils are equal, round, and reactive to light.       Significant Labs:   Blood Culture: No results for input(s): LABBLOO in the last 48 hours.  CBC:   Recent Labs   Lab 09/21/19  1340   WBC 16.66*   HGB 16.0   HCT 47.3        CMP:   Recent Labs   Lab 09/21/19  1340      K 4.3      CO2 29      BUN 17   CREATININE 1.0   CALCIUM 9.7   PROT 8.2   ALBUMIN 5.0   BILITOT 1.4*   ALKPHOS 43*   AST 24   ALT 29   ANIONGAP 8   EGFRNONAA >60.0     Cardiac Markers: No results for input(s): CKMB, MYOGLOBIN, BNP, TROPISTAT in the last 48 hours.  Coagulation: No results for input(s): PT, INR, APTT in the last 48 hours.  Lactic Acid: No results for input(s): LACTATE in the last 48 hours.  Lipase:   Recent Labs   Lab 09/21/19  1340   LIPASE 28     Lipid Panel: No results for input(s): CHOL, HDL, LDLCALC, TRIG, CHOLHDL in the last 48 hours.  Magnesium: No results for input(s): MG in the last 48 hours.  Troponin: No results for input(s): TROPONINI in the last 48 hours.  TSH: No results for input(s): TSH in the last 4320 hours.  Urine Studies:   Recent Labs   Lab 09/21/19  1415   COLORU Yellow   APPEARANCEUA Clear   PHUR 8.0   SPECGRAV 1.000*   PROTEINUA Negative   GLUCUA Negative   KETONESU Negative   BILIRUBINUA Negative   OCCULTUA Negative    NITRITE Negative   UROBILINOGEN Negative   LEUKOCYTESUR Negative       Significant Imaging: I have reviewed all pertinent imaging results/findings within the past 24 hours.   Ct Abdomen Pelvis With Contrast    Result Date: 9/21/2019  CMS MANDATED QUALITY DATA - CT RADIATION - 436 All CT scans at this facility utilize dose modulation, iterative reconstruction, and/or weight based dosing when appropriate to reduce radiation dose to as low as reasonably achievable. CLINICAL HISTORY: ([id number])17 y/o  (2000) M Abd pain, fever, abscess suspected; TECHNIQUE: (A#[id number], exam time 9/21/2019 15:17) CT ABDOMEN PELVIS WITH CONTRAST [id number] Axial CT images of the abdomen and pelvis were obtained from the dome of the diaphragm to the proximal thighs. COMPARISON: None available. FINDINGS: Lower Thorax: The lung bases are clear. The heart is normal in size. CT Abdomen: Liver: The liver is normal in size and imaging appearance. Gallbladder: The gallbladder is within normal limits. Biliary Tree: No intra or extrahepatic ductal dilation. Spleen: Normal. Pancreas: The pancreas is normal. Adrenal Glands: Normal Kidneys: The kidneys are normal in imaging appearance without hydronephrosis or hydroureter. Vasculature: Normal. Lymph nodes: No abdominal lymphadenopathy is seen. Intraperitoneal structures: There is no ascites. Bowel: There 2 small appendicoliths in the appendix which is dilated and distended measuring 13 mm in diameter with mucosal hyperemia and adjacent fat stranding consistent with acute appendicitis with no abscess, intra-abdominal free air or obstruction.  Note, there is a moderate volume of gas and stool seen in the colon with a nonobstructive bowel gas pattern. Abdominal wall: The abdominal wall and musculature are normal. Musculoskeletal: Normal. CT Pelvis: Bladder: Normal. Reproductive Organs: The prostate is within normal limits. Pelvic Lymph nodes: No pelvic lymphadenopathy or pelvic mass is identified.      Acute appendicitis with no abscess, intra-abdominal free air or obstruction. Electronically signed by: Judah Moon MD Date:    09/21/2019 Time:    15:24      Assessment/Plan:     * Acute appendicitis  CT showed appendicitis. IV abx given in ED. General Surgery consulted with plan for appendectomy. NPO      Leukocytosis  Likely 2/2 acute appendicitis.  IV abx given in ED. Monitor. Repeat CBC in AM      History of complete transposition of great vessels  No acute issues.        VTE Risk Mitigation (From admission, onward)    None             JARET Robertson  Department of Hospital Medicine   [facility]

## 2019-09-21 NOTE — HPI
Pleasant 17 yo M presented to the hospital this afternoon with pain in thelower abdomen.  He notes that he woke with pain early this Am.  NOtes that the pain got progressively worse and caused him to come to er.  Notes that pain was in lower abdomen.  He reports having burning and discomfort with urination.  He denies sick contacts or changes in bowel habits.  PT has no family history of IBD.  Pt otherwise without complaint. He is healthy and has had transposition procedure as an infant.  No abdominal surgical history

## 2019-09-21 NOTE — ANESTHESIA POSTPROCEDURE EVALUATION
Anesthesia Post Evaluation    Patient: Harry Patel    Procedure(s) Performed: Procedure(s) (LRB):  APPENDECTOMY, LAPAROSCOPIC (N/A)    Final Anesthesia Type: general  Patient location during evaluation: PACU  Patient participation: Yes- Able to Participate  Level of consciousness: awake and alert and oriented  Post-procedure vital signs: reviewed and stable  Pain management: adequate  Airway patency: patent  PONV status at discharge: No PONV  Anesthetic complications: no      Cardiovascular status: blood pressure returned to baseline and hemodynamically stable  Respiratory status: unassisted, spontaneous ventilation and room air  Hydration status: euvolemic  Follow-up not needed.          Vitals Value Taken Time   /60 9/21/2019  6:45 PM   Temp 36.6 °C (97.8 °F) 9/21/2019  6:12 PM   Pulse 48 9/21/2019  6:51 PM   Resp 18 9/21/2019  6:51 PM   SpO2 98 % 9/21/2019  6:51 PM   Vitals shown include unvalidated device data.      No case tracking events are documented in the log.      Pain/Angel Score: Pain Rating Prior to Med Admin: 7 (9/21/2019  1:56 PM)  Pain Rating Post Med Admin: 3 (9/21/2019  2:26 PM)  Angel Score: 10 (9/21/2019  6:26 PM)

## 2019-09-21 NOTE — OP NOTE
Preoperative Diagnosis: acute appendicitis    Post op Diagnosis:  Same    Procedure: laparoscopic appendectomy    GETA    BLood loss: 15 cc's    Indication of Procedure:  17 yo M presented to ER with signs/symptoms suggestive of acute appendicitis. He was taken to OR for lap appy    Description of procedure: following signing of informed consent he was taken to OR and placed in supine position.  GETA administered without event. Quinn placed.  Abdomen prepped and draped and an appropriate time out procedure performed.  I make a small vertical incision just below umbilicus.  I enter abdominal cavity with veress needle and  Administer Co2 to achieve pneumoperitoneum to 15 mm HG.  I then   Placed two 5 mm trocars in lower midline under direct visualization.  Pt placed in trendelenburg with tilt to his L.  I identify the appendix and grasp it holding it up toward anterior abdominal wall.  I then dissect at its base  the appendix from the mesoappendix.  I next transect the mesoappendix with a ligasure device.  I then staple across the appendix at its base.  Appendix is removed with assistance of endocatch bag.  I visualize staple line and mesoappendix.  I see no evidence of bleeding.  I remove all trocars under direct visualization.  I close the fascia with 0 vicryl and skin with 4-0 monocryl.  Pt extubated and brought to recovery room in staple condition.  All counts were correct.  Blood loss 15 cc's

## 2019-09-21 NOTE — ANESTHESIA PREPROCEDURE EVALUATION
2019  Harry Patel is a 18 y.o., male.    Patient Active Problem List   Diagnosis    History of complete transposition of great vessels    Personal history of surgery to heart and great vessels, presenting hazards to health    Hiatal hernia    Heart burn    Pulmonary artery stenosis    Orthostatic dizziness    Adult congenital heart disease    Acute appendicitis    Leukocytosis       No results found for this or any previous visit.     Lab Results   Component Value Date    WBC 16.66 (H) 2019    HGB 16.0 2019    HCT 47.3 2019    MCV 92 2019     2019     BMP  Lab Results   Component Value Date     2019    K 4.3 2019     2019    CO2 29 2019    BUN 17 2019    CREATININE 1.0 2019    CALCIUM 9.7 2019    ANIONGAP 8 2019    ESTGFRAFRICA >60.0 2019    EGFRNONAA >60.0 2019     re:Harry Patel  :2000     Pediatric Cardiology Note     Harry Patel is a 18 y.o. male seen in follow-up in my main campus pediatric cardiology clinic today.  To summarize, her diagnoses are as follows:  Diagnoses:  1.  D-TGA s/p arterial switch  2.  Very mild valve PS and branch PA narrowing  3.  No significant aortic root enlargement  4.  History of 2 normal Sestamibi stress tests  5.  Reassuring coronary CT 7/15  6.  Hiatal hernia on CT scan, heartburn  7.  History of paralyzed diaphragm - required plication  8.  Orthostatic palpitations     Plan:  1.  At present, there is no need for activity restriction.  He is a power .  We did in the past discuss the potential need to limit him from power lifting as he gets older if we see dilation of his aortic root.  2.  No need for SBEP  3.  We discussed weight lifting supplements.  Steroids should be avoided.  Excessive  stimulants should be avoided.  4.  Healthy diet, regular aerobic exercise  5.  Follow up 1 year in [place] with echo,ekg,holter  6.  Likely get a cardiac MRI in 2010    Pre-op Assessment    I have reviewed the Patient Summary Reports.    I have reviewed the Nursing Notes.   I have reviewed the Medications.     Review of Systems  Anesthesia Hx:  No problems with previous Anesthesia  History of prior surgery of interest to airway management or planning: heart surgery, lung surgery. Previous anesthesia: General Denies Family Hx of Anesthesia complications.   Denies Personal Hx of Anesthesia complications.   Social:  Non-Smoker, No Alcohol Use    Cardiovascular:   Dysrhythmias (orthostatic palpitations) Hx of transposition of great arteries (type D), s/p arterial switch procedure, doing well at recent cardiologist visits, very active, plays sports   Pulmonary:   Hx of diaphragm paralysis, s/p plication    Hepatic/GI:   Hiatal Hernia,    Musculoskeletal:  Musculoskeletal Normal    Endocrine:  Endocrine Normal        Physical Exam  General:  Well nourished    Airway/Jaw/Neck:  Airway Findings: Mouth Opening: Normal Tongue: Normal  General Airway Assessment: Adult  Mallampati: III  TM Distance: Normal, at least 6 cm  Jaw/Neck Findings:  Neck ROM: Normal ROM       Chest/Lungs:  Chest/Lungs Findings: Clear to auscultation, Normal Respiratory Rate     Heart/Vascular:  Heart Findings: Rate: Normal  Rhythm: Regular Rhythm  Sounds: Normal  Heart Murmur  Systolic  Systolic Heart Murmur Description: Holosystolic  Systolic Heart Murmur Grade: Grade III     Abdomen:  Abdomen Findings: Normal    Musculoskeletal:  Musculoskeletal Findings: Normal   Skin:  Skin Findings: Normal    Mental Status:  Mental Status Findings:  Cooperative, Alert and Oriented         Anesthesia Plan  Type of Anesthesia, risks & benefits discussed:  Anesthesia Type:  general  Patient's Preference:   Intra-op Monitoring Plan: standard ASA monitors  Intra-op  Monitoring Plan Comments:   Post Op Pain Control Plan: multimodal analgesia  Post Op Pain Control Plan Comments:   Induction:   IV  Beta Blocker:  Patient is not currently on a Beta-Blocker (No further documentation required).       Informed Consent: Patient understands risks and agrees with Anesthesia plan.  Questions answered. Anesthesia consent signed with patient.  ASA Score: 3     Day of Surgery Review of History & Physical: I have interviewed and examined the patient. I have reviewed the patient's H&P dated:  There are no significant changes.      Anesthesia Plan Notes: GETA  Zofran/Decadron  S/p antibiotics

## 2019-09-22 VITALS
WEIGHT: 175 LBS | OXYGEN SATURATION: 97 % | TEMPERATURE: 98 F | DIASTOLIC BLOOD PRESSURE: 61 MMHG | RESPIRATION RATE: 18 BRPM | SYSTOLIC BLOOD PRESSURE: 123 MMHG | HEART RATE: 54 BPM | HEIGHT: 71 IN | BODY MASS INDEX: 24.5 KG/M2

## 2019-09-22 LAB
ALBUMIN SERPL BCP-MCNC: 4.2 G/DL (ref 3.5–5.2)
ALP SERPL-CCNC: 41 U/L (ref 55–135)
ALT SERPL W/O P-5'-P-CCNC: 23 U/L (ref 10–44)
ANION GAP SERPL CALC-SCNC: 7 MMOL/L (ref 8–16)
AST SERPL-CCNC: 17 U/L (ref 10–40)
BASOPHILS # BLD AUTO: 0.02 K/UL (ref 0–0.2)
BASOPHILS NFR BLD: 0.2 % (ref 0–1.9)
BILIRUB SERPL-MCNC: 0.8 MG/DL (ref 0.1–1)
BUN SERPL-MCNC: 18 MG/DL (ref 6–20)
CALCIUM SERPL-MCNC: 9.4 MG/DL (ref 8.7–10.5)
CHLORIDE SERPL-SCNC: 103 MMOL/L (ref 95–110)
CO2 SERPL-SCNC: 26 MMOL/L (ref 23–29)
CREAT SERPL-MCNC: 0.9 MG/DL (ref 0.5–1.4)
DIFFERENTIAL METHOD: ABNORMAL
EOSINOPHIL # BLD AUTO: 0 K/UL (ref 0–0.5)
EOSINOPHIL NFR BLD: 0 % (ref 0–8)
ERYTHROCYTE [DISTWIDTH] IN BLOOD BY AUTOMATED COUNT: 12.3 % (ref 11.5–14.5)
EST. GFR  (AFRICAN AMERICAN): >60 ML/MIN/1.73 M^2
EST. GFR  (NON AFRICAN AMERICAN): >60 ML/MIN/1.73 M^2
GLUCOSE SERPL-MCNC: 163 MG/DL (ref 70–110)
HCT VFR BLD AUTO: 43.5 % (ref 40–54)
HGB BLD-MCNC: 14.7 G/DL (ref 14–18)
IMM GRANULOCYTES # BLD AUTO: 0.05 K/UL (ref 0–0.04)
IMM GRANULOCYTES NFR BLD AUTO: 0.5 % (ref 0–0.5)
LYMPHOCYTES # BLD AUTO: 0.9 K/UL (ref 1–4.8)
LYMPHOCYTES NFR BLD: 8.5 % (ref 18–48)
MAGNESIUM SERPL-MCNC: 2 MG/DL (ref 1.6–2.6)
MCH RBC QN AUTO: 31.1 PG (ref 27–31)
MCHC RBC AUTO-ENTMCNC: 33.8 G/DL (ref 32–36)
MCV RBC AUTO: 92 FL (ref 82–98)
MONOCYTES # BLD AUTO: 0.4 K/UL (ref 0.3–1)
MONOCYTES NFR BLD: 3.4 % (ref 4–15)
NEUTROPHILS # BLD AUTO: 9.3 K/UL (ref 1.8–7.7)
NEUTROPHILS NFR BLD: 87.4 % (ref 38–73)
NRBC BLD-RTO: 0 /100 WBC
PLATELET # BLD AUTO: 146 K/UL (ref 150–350)
PMV BLD AUTO: 10.8 FL (ref 9.2–12.9)
POTASSIUM SERPL-SCNC: 4.4 MMOL/L (ref 3.5–5.1)
PROT SERPL-MCNC: 7.1 G/DL (ref 6–8.4)
RBC # BLD AUTO: 4.72 M/UL (ref 4.6–6.2)
SODIUM SERPL-SCNC: 136 MMOL/L (ref 136–145)
WBC # BLD AUTO: 10.62 K/UL (ref 3.9–12.7)

## 2019-09-22 PROCEDURE — 85025 COMPLETE CBC W/AUTO DIFF WBC: CPT

## 2019-09-22 PROCEDURE — G0378 HOSPITAL OBSERVATION PER HR: HCPCS

## 2019-09-22 PROCEDURE — 83735 ASSAY OF MAGNESIUM: CPT

## 2019-09-22 PROCEDURE — 63600175 PHARM REV CODE 636 W HCPCS: Performed by: SURGERY

## 2019-09-22 PROCEDURE — 36415 COLL VENOUS BLD VENIPUNCTURE: CPT

## 2019-09-22 PROCEDURE — 80053 COMPREHEN METABOLIC PANEL: CPT

## 2019-09-22 PROCEDURE — 25000003 PHARM REV CODE 250: Performed by: SURGERY

## 2019-09-22 PROCEDURE — 96376 TX/PRO/DX INJ SAME DRUG ADON: CPT

## 2019-09-22 PROCEDURE — S0030 INJECTION, METRONIDAZOLE: HCPCS | Performed by: SURGERY

## 2019-09-22 RX ORDER — DEXTROMETHORPHAN HYDROBROMIDE, GUAIFENESIN 5; 100 MG/5ML; MG/5ML
650 LIQUID ORAL EVERY 8 HOURS
Refills: 0 | COMMUNITY
Start: 2019-09-22

## 2019-09-22 RX ORDER — DOCUSATE SODIUM 100 MG/1
100 CAPSULE, LIQUID FILLED ORAL 2 TIMES DAILY PRN
Refills: 0 | COMMUNITY
Start: 2019-09-22

## 2019-09-22 RX ORDER — IBUPROFEN 600 MG/1
600 TABLET ORAL 3 TIMES DAILY PRN
Qty: 30 TABLET | Refills: 0
Start: 2019-09-22 | End: 2019-09-29

## 2019-09-22 RX ADMIN — CEFAZOLIN SODIUM 2 G: 2 SOLUTION INTRAVENOUS at 06:09

## 2019-09-22 RX ADMIN — HYDROCODONE BITARTRATE AND ACETAMINOPHEN 1 TABLET: 5; 325 TABLET ORAL at 12:09

## 2019-09-22 RX ADMIN — METRONIDAZOLE 500 MG: 500 SOLUTION INTRAVENOUS at 05:09

## 2019-09-22 RX ADMIN — HYDROCODONE BITARTRATE AND ACETAMINOPHEN 1 TABLET: 5; 325 TABLET ORAL at 10:09

## 2019-09-22 RX ADMIN — SODIUM CHLORIDE: 0.9 INJECTION, SOLUTION INTRAVENOUS at 12:09

## 2019-09-22 NOTE — PLAN OF CARE
Plan of care reviewed with pt and family. Pt's pain has been assessed and pain meds were given. VSS- HR runs low (his normal). Bed in lowest position, call light near. Family has remained in the room.

## 2019-09-22 NOTE — PLAN OF CARE
Nursing Transfer Note      9/21/2019     Transfer To: 1222    Transfer via bed        Transported by [facility]    Anesthesia release 3011    Chart send with patient: Yes            Upon arrival to floor: report called to Michelle @ 3131S

## 2019-09-22 NOTE — NURSING
Pt DCed home; instructions & teaching given (no baths, no heavy lifting, etc). Pt verbalized understanding.     Pt feeling very well, stable, w/ no complains. Pt stated that he needed to leave ASA because he wanted to study for a test that he has tomorrow.     Pt preferred to ambulate to his car accompanied by girlfriend?

## 2019-09-22 NOTE — DISCHARGE SUMMARY
[facility] Medicine  Discharge Summary      Patient Name: Harry Patel  MRN: [id number]  Admission Date: 9/21/2019  Hospital Length of Stay: 0 days  Discharge Date and Time:  09/22/2019 12:50 PM  Attending Physician: Christopher Moreira MD   Discharging Provider: Lily Jimenez NP  Primary Care Provider: Primary Doctor No      HPI:   Harry Patel is a 18 y.o. male with a history as below who presented to the ED with a CC of acute onset of mid abdominal pain with nausea. Denies fever, chills, diaphoresis, SOB, dizziness, HA, chest pain, palpitations, NVD, recent trauma or any other associated symptoms. No aggravating and alleviating factor.  Does not smoke cigarettes, drink or do illegal drugs. Lab and imaging obtained and reviewed.   CT abdomen/pelvis showed acute appendicitis with no abscess, intra-abdominal free air or obstruction.  CBC significant for elevated WBC. IV abx given in the ER. Gen surgery consulted with plans for appendectomy            Procedure(s) (LRB):  APPENDECTOMY, LAPAROSCOPIC (N/A)      Hospital Course:   Admitted for abdominal 2/2 acute appendicitis.    09/21/2019 - CT abdomen/pelvis showed acute appendicitis with no abscess, intra-abdominal free air or obstruction.  09/21/2019 - Leukocytosis 2/2 acute appendicitis - IV abx given   09/21/2019 - General surgery consulted. - plans for appendectomy  9/21/2019- laparotomy appendectomy   9/22/19- stable and requesting discharge home. No abx upon discharge per Dr. Power     Review of Systems   Constitutional: Negative for chills, diaphoresis and fever.   HENT: Negative for congestion, postnasal drip, sinus pressure and sore throat.    Eyes: Negative for visual disturbance.   Respiratory: Negative for cough, chest tightness, shortness of breath and wheezing.    Cardiovascular: Negative for chest pain, palpitations and leg swelling.   Gastrointestinal: positive for incisional pain only   Negative for  abdominal pain, blood in stool, constipation, diarrhea and vomiting. No BM (+) flatus. Mild distension with trace scrotal edema.   Endocrine: Negative.    Genitourinary: Negative for dysuria.   Musculoskeletal: Negative.    Skin: Negative.    Allergic/Immunologic: Negative.    Neurological: Negative for dizziness, weakness, numbness and headaches.   Hematological: Negative.    Psychiatric/Behavioral: Negative.      Physical Exam   Constitutional: He is oriented to person, place, and time. He appears well-developed and well-nourished. He is cooperative.  Non-toxic appearance. No distress.   HENT:   Head: Normocephalic and atraumatic.   Eyes: Pupils are equal, round, and reactive to light. Conjunctivae and lids are normal.   Neck: Trachea normal, normal range of motion and full passive range of motion without pain. Neck supple. Normal carotid pulses and no JVD present. No tracheal deviation present. No thyroid mass and no thyromegaly present.   Cardiovascular: Normal rate, regular rhythm, S1 normal, S2 normal, normal heart sounds and normal pulses.   Pulmonary/Chest: Effort normal and breath sounds normal. No stridor.   Abdominal: Soft. Normal appearance and bowel sounds are normal. There is incisional tenderness.  Mild distension with trace scrotal edema.    Musculoskeletal: Normal range of motion.   Neurological: He is alert and oriented to person, place, and time. He has normal strength. No cranial nerve deficit or sensory deficit.   Skin: Skin is warm, dry and intact. He is not diaphoretic. No cyanosis. Nails show no clubbing.   Psychiatric: He has a normal mood and affect. His speech is normal and behavior is normal. Judgment and thought content normal. Cognition and memory are normal.           Consults:   Consults (From admission, onward)        Status Ordering Provider     Inpatient consult to General Surgery  Once     Provider:  Harsh Power MD    Completed LEATHA GLASS     Inpatient consult to  Hospitalist  Once     Provider:  Christopher Moreira MD    Acknowledged LEATHA GLASS          No new Assessment & Plan notes have been filed under this hospital service since the last note was generated.  Service: Hospital Medicine    Final Active Diagnoses:    Diagnosis Date Noted POA    PRINCIPAL PROBLEM:  Acute appendicitis [K35.80] 09/21/2019 Yes    Leukocytosis [D72.829] 09/21/2019 Yes    Acute appendicitis with localized peritonitis without perforation [K35.30] 09/21/2019 Yes    History of complete transposition of great vessels [Z87.74] 09/21/2012 Not Applicable      Problems Resolved During this Admission:       Discharged Condition: good    Disposition: Home or Self Care    Follow Up:  Follow-up Information     Harsh Power MD.    Specialties:  General Surgery, Colon and Rectal Surgery, Surgery  Why:  Dr. Power gave patient his business card instructing him to follow up if needed.   Contact information:  [address]  [address]  [facility] [address]  [phone]                 Patient Instructions:      Diet Adult Regular     Notify your health care provider if you experience any of the following:  temperature >100.4     Notify your health care provider if you experience any of the following:  persistent nausea and vomiting or diarrhea     Notify your health care provider if you experience any of the following:  severe uncontrolled pain     Notify your health care provider if you experience any of the following:  redness, tenderness, or signs of infection (pain, swelling, redness, odor or green/yellow discharge around incision site)     Notify your health care provider if you experience any of the following:  difficulty breathing or increased cough     Notify your health care provider if you experience any of the following:  severe persistent headache     Notify your health care provider if you experience any of the following:  worsening rash     Notify your health care provider if you  experience any of the following:  persistent dizziness, light-headedness, or visual disturbances     Notify your health care provider if you experience any of the following:  increased confusion or weakness     Notify your health care provider if you experience any of the following:     No dressing needed   Order Comments: Not not submerge wound in water. Shower only until f/u with surgeon. Pat dry, leave open to air. Call surgeon for redness, drainage or pain not improving on a daily basis.     Activity as tolerated   Order Comments: - No restrictions yet encouraged to take it easy for 1-2 weeks.  - Tylenol and/or Ibuprofen PRN pain. Patient did not want narcotics.       Significant Diagnostic Studies: Microbiology: Blood Culture No results found for: LABBLOO and Wound Culture: negative  Radiology: CT scan: CT ABDOMEN PELVIS WITH CONTRAST:   Results for orders placed or performed during the hospital encounter of 09/21/19   CT Abdomen Pelvis With Contrast    Narrative    CMS MANDATED QUALITY DATA - CT RADIATION - 436    All CT scans at this facility utilize dose modulation, iterative reconstruction, and/or weight based dosing when appropriate to reduce radiation dose to as low as reasonably achievable.    CLINICAL HISTORY:  ([id number])19 y/o  (2000) M    Abd pain, fever, abscess suspected;    TECHNIQUE:  (A#[id number], exam time 9/21/2019 15:17)    CT ABDOMEN PELVIS WITH CONTRAST [id number]    Axial CT images of the abdomen and pelvis were obtained from the dome of the diaphragm to the proximal thighs.    COMPARISON:  None available.    FINDINGS:  Lower Thorax:    The lung bases are clear. The heart is normal in size.    CT Abdomen:    Liver: The liver is normal in size and imaging appearance.    Gallbladder: The gallbladder is within normal limits.    Biliary Tree: No intra or extrahepatic ductal dilation.    Spleen: Normal.    Pancreas: The pancreas is normal.    Adrenal Glands: Normal    Kidneys: The kidneys are  normal in imaging appearance without hydronephrosis or hydroureter.    Vasculature: Normal.    Lymph nodes: No abdominal lymphadenopathy is seen.    Intraperitoneal structures: There is no ascites.    Bowel: There 2 small appendicoliths in the appendix which is dilated and distended measuring 13 mm in diameter with mucosal hyperemia and adjacent fat stranding consistent with acute appendicitis with no abscess, intra-abdominal free air or obstruction.  Note, there is a moderate volume of gas and stool seen in the colon with a nonobstructive bowel gas pattern.    Abdominal wall: The abdominal wall and musculature are normal.    Musculoskeletal: Normal.    CT Pelvis:    Bladder: Normal.    Reproductive Organs: The prostate is within normal limits.    Pelvic Lymph nodes: No pelvic lymphadenopathy or pelvic mass is identified.      Impression    Acute appendicitis with no abscess, intra-abdominal free air or obstruction.      Electronically signed by: Judah Moon MD  Date:    09/21/2019  Time:    15:24       Pending Diagnostic Studies:     Procedure Component Value Units Date/Time    Specimen to Pathology - Surgery [[id number]] Collected:  09/21/19 2029    Order Status:  Sent Lab Status:  No result     Specimen:  Tissue          Medications:  Reconciled Home Medications:      Medication List      START taking these medications    acetaminophen 650 MG Tbsr  Commonly known as:  TYLENOL  Take 1 tablet (650 mg total) by mouth every 8 (eight) hours.     docusate sodium 100 MG capsule  Commonly known as:  COLACE  Take 1 capsule (100 mg total) by mouth 2 (two) times daily as needed for Constipation.     ibuprofen 600 MG tablet  Commonly known as:  ADVIL,MOTRIN  Take 1 tablet (600 mg total) by mouth 3 (three) times daily as needed for Pain.            Indwelling Lines/Drains at time of discharge:   Lines/Drains/Airways     None                 Time spent on the discharge of patient: 20 minutes  Patient was seen and examined  on the date of discharge and determined to be suitable for discharge.  Chart reviewed patient was discharged before my examination.  He was cleared for discharge by surgery       Lily Jimenez NP  Department of Hospital Medicine  [facility]

## 2019-12-20 DIAGNOSIS — Q24.9 ADULT CONGENITAL HEART DISEASE: Primary | ICD-10-CM

## 2019-12-20 DIAGNOSIS — Z98.890 PERSONAL HISTORY OF SURGERY TO HEART AND GREAT VESSELS, PRESENTING HAZARDS TO HEALTH: ICD-10-CM

## 2019-12-20 DIAGNOSIS — Q25.6 PULMONARY ARTERY STENOSIS: ICD-10-CM

## 2019-12-20 DIAGNOSIS — Z87.74 HISTORY OF COMPLETE TRANSPOSITION OF GREAT VESSELS: ICD-10-CM

## 2023-11-07 NOTE — PROGRESS NOTES
"12/15/2017    re:Harry Patel  :2000    Cornel J. Jeansonne, MD  [address] [address]    Pediatric Cardiology Note    Dear Dr. Jeansonne:    Harry Patel is a 17 y.o. male seen in follow-up in my [place] pediatric cardiology clinic today due to a history of transposition of the great arteries status post arterial switch operation at [facility] during the  period.  There was no associated VSD or pulmonary stenosis by report.  However, his mother tells me today that they had "significant problems with his coronary arteries".  As an infant, she states that he went for cardiac catheterization in [place] by Dr. White.  His surgical course was complicated by paralyzed left hemidiaphragm requiring a plication.    Secondary to some lightheadedness and shortness of breath associated with football, Dr Kent referred him for a Sestamibi stess test (at [facility]) 2008, and 2011 - normal both times.    Interval history:  Since I last saw him a year ago, she has done extremely well.  He denies any symptoms related to the cardiovascular system.  Specifically, he denies exertional chest pain, palpitations, syncope, near-syncope, cyanosis, or edema.  He played football.  He is now on the weight lifting team.  He works out extremely hard.  He had questions about multiple supplements.  He has lost a significant amount of weight in the past year.    The review of systems is as noted above. It is otherwise negative for other symptoms related to the general, neurological, psychiatric, endocrine, gastrointestinal, genitourinary, respiratory, dermatologic, musculoskeletal, hematologic, and immunologic systems.    Past Medical History:   Diagnosis Date    Diaphragmatic paralysis     post-op; left side    Pulmonary stenosis     mild    TGA (transposition of great arteries)     Wrist fracture      Past Surgical History:   Procedure Laterality Date    ARTERIAL " "SWITCH  12/17/99    @ [facility]    DIAPHRAGM PLICATION  12/27/99    @ [facility] - left     Family History   Problem Relation Age of Onset    Congenital heart disease Sister      vsd    AVM Mother      Social History     Social History    Marital status: Single     Spouse name: N/A    Number of children: N/A    Years of education: N/A     Social History Main Topics    Smoking status: Never Smoker    Smokeless tobacco: None      Comment: grandfather smokes outside.    Alcohol use None    Drug use: Unknown    Sexual activity: Not Asked     Other Topics Concern    None     Social History Narrative    Lives with mom and sister.  No smoking in house.      No current outpatient prescriptions on file prior to visit.     No current facility-administered medications on file prior to visit.      No Known Allergies    BP (!) 119/56 (BP Location: Right arm, Patient Position: Sitting, BP Method: Large (Automatic))   Pulse (!) 54   Temp 98.1 °F (36.7 °C) (Oral)   Resp 18   Ht 5' 11" (1.803 m)   Wt 83.1 kg (183 lb 3.2 oz)   SpO2 100%   BMI 25.55 kg/m²     Of note, he weighed 104.2 kg in clinic last year.  In general, he is a muscular, healthy-appearing nondysmorphic male in no apparent distress.  The eyes, nares, and oropharynx are clear.  The head is normocephalic and atraumatic.  The neck is supple without jugular venous distention or thyroid enlargement.  The lungs are clear to auscultation bilaterally.  There is a well-healed median sternotomy incision as well as a left sided incision.  The first and second heart sounds are normal.  There are no gallops, rubs, or clicks in the supine position.  There is a grade 2/6 systolic ejection murmur heard best at the upper left sternal border with radiation to the lung fields bilaterally.  The abdominal exam is benign without hepatosplenomegaly, tenderness, or distention.  Pulses are normal in all 4 extremities with brisk capillary refill and no clubbing, cyanosis, or edema.  No " rashes are noted.    Echo today:  I reviewed the echo performed today.  Excellent biventricular function is noted.  There is no aortic insufficiency.  I see no evidence of aortic root enlargement on this study with the sinuses of Valsalva measuring about 3.4 cm.  It is difficult to image the branch pulmonary arteries, but the peak velocity in both appears to be about 2 m/s.  Trivial tricuspid insufficiency estimates normal right ventricular pressures.    EKG is normal   With sinus bradycardia.    Chest CT 7/2015 -   1.In this patient with history of TGA, post operative changes noted consistent with prior arterial switch repair.  Mild cardiomegaly.  2. Coronary arteries are patent.  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   3. Moderate-sized hiatal hernia.    Diagnoses:  1.  D-TGA s/p arterial switch  2.  Very mild valve PS and branch PA narrowing  3.  No significant aortic root enlargement  4.  History of 2 normal Sestamibi stress tests  5.  Reassuring coronary CT 7/15  6.  Obesity - resolved  7.  Hiatal hernia on CT scan, heartburn  8.  History of paralyzed diaphragm - required plication    Plan:  1.  At present, there is no need for activity restriction.  He is a power .  We did discuss the potential need to limit him from power lifting as he gets older if we see dilation of his aortic root.  2.  No need for SBEP  3.  We discussed weight lifting supplements.  Steroid should be avoided.  Excessive stimulant should be avoided.  4.  Healthy diet, increased aerobic exercise  5.  Follow up 1 year in [place] with echo,ekg,holter    He is cleared to play all sports.    Thank you for referring this patient to our clinic.  Please call with any questions.    Sincerely,        Feliberto Dickson MD  Pediatric Cardiology  Adult Congenital Heart  Disease  Pediatric Heart Failure and Transplantation  [facility] [address] [place]  [address]  [phone]      Mixed Superficial And Nodular Bcc Histology Text: There were nodular aggregates of basaloid cells in a budding off the dermis and within the dermis accompanied by slit-like retractions in a superficial and nodular pattern.
